# Patient Record
Sex: MALE | Race: WHITE | NOT HISPANIC OR LATINO | Employment: FULL TIME | URBAN - METROPOLITAN AREA
[De-identification: names, ages, dates, MRNs, and addresses within clinical notes are randomized per-mention and may not be internally consistent; named-entity substitution may affect disease eponyms.]

---

## 2025-05-20 NOTE — PROGRESS NOTES
Subjective   Patient ID: Amadeo Anne is a 35 y.o. male    HPI  35 y.o. male who presents to hospitals for fertility consultation. He reports he and his partner (aged 27 years) have been trying to conceive. He notes taking an at home semen analysis and received low levels. His partner has been tracking her menstrual cycles using an ni. She has had a miscarriages in the past. Denies any urinary symptoms such as urgency, frequency, hematuria, pain, burning, and infections. He denies any ejaculation and erectile dysfunctions. Varicocele noted during physical exam. Otherwise normal exam today.       Review of Systems    All systems were reviewed. Anything negative was noted in the HPI.    Objective   Physical Exam  Genitourinary:     Pubic Area: No rash.       Penis: Normal and circumcised. No hypospadias.       Testes:         Right: Mass, tenderness, swelling, testicular hydrocele or varicocele not present. Right testis is descended.         Left: Mass, tenderness, swelling, testicular hydrocele or varicocele not present. Left testis is descended.      Epididymis:      Right: Not inflamed or enlarged. No mass or tenderness.      Left: Not inflamed or enlarged. No mass or tenderness.         General: Well developed, well nourished, alert and cooperative, appears in no acute distress   Eyes: Non-injected conjunctiva, sclera clear, no proptosis   Cardiac: Extremities are warm and well perfused. No edema, cyanosis or pallor   Lungs: Breathing is easy, non-labored. Speaking in clear and complete sentences. Normal diaphragmatic movement   MSK: Ambulatory with steady gait, unassisted   Neuro: Alert and oriented to person, place, and time   Psych: Demonstrates good judgment and reason, without hallucinations, abnormal affect or abnormal behaviors   Skin: No obvious lesions, no rashes       No CVA tenderness bilaterally   No suprapubic pain or discomfort       Medical History[1]      Surgical History[2]      Assessment/Plan    Fertility Consultation    35 y.o. male who presents for the above condition, We had a very long and extensive discussion with the patient regarding the pathophysiology, differential diagnosis, risk factor, management, natural history, incidence and diagnostic work-up of the condition.  Explained to him that semen analysis are highly variable test, and that his results and the role of total motile sperm which is the best indicator for successful fertility potential using a natural conception. I explained to him all the lifestyle modification that could improve the semen parameters including refraining from smoking alcohol, healthy eating sleeping and exercise, losing some weight, refrain from anything that would increase the temperature of the scrotum such as hot tubs, Jacuzzi, saunas, or tight underwear. We discussed the optimal fertile window during which they should have intercourse and I instructed him to abstain for 4 days before semen analysis. He verbalized understanding and would like to proceed.     - Educated about optimal timing for conception  - Advised semen analysis as a diagnostic tool to evaluate fertility     Plan:  - Semen Analysis  - Follow-up in 1 month with results      E&M visit today is associated with current or anticipated ongoing medical care services related to a patient's single, serious condition or a complex condition.     5/21/2025    Scribe Attestation  By signing my name below, I, Shannon Smith attest that this documentation has been prepared under the direction and in the presence of Dr. Gabriele Flores.          [1] No past medical history on file.  [2] No past surgical history on file.

## 2025-05-21 ENCOUNTER — APPOINTMENT (OUTPATIENT)
Dept: UROLOGY | Facility: HOSPITAL | Age: 35
End: 2025-05-21

## 2025-05-21 DIAGNOSIS — Z31.41 FERTILITY TESTING: Primary | ICD-10-CM

## 2025-05-21 PROCEDURE — 99204 OFFICE O/P NEW MOD 45 MIN: CPT | Performed by: STUDENT IN AN ORGANIZED HEALTH CARE EDUCATION/TRAINING PROGRAM

## 2025-05-21 PROCEDURE — 99214 OFFICE O/P EST MOD 30 MIN: CPT | Performed by: STUDENT IN AN ORGANIZED HEALTH CARE EDUCATION/TRAINING PROGRAM

## 2025-06-25 ENCOUNTER — APPOINTMENT (OUTPATIENT)
Dept: UROLOGY | Facility: HOSPITAL | Age: 35
End: 2025-06-25

## 2025-07-15 ENCOUNTER — APPOINTMENT (OUTPATIENT)
Dept: ENDOCRINOLOGY | Facility: CLINIC | Age: 35
End: 2025-07-15

## 2025-07-15 DIAGNOSIS — Z31.41 FERTILITY TESTING: ICD-10-CM

## 2025-07-15 LAB
% EX RESIDUAL CYTOPLASM (SEMEN): 0 %
% HEAD DEFECTS (SEMEN): 99.5 %
% NECK MIDPIECE (SEMEN): 47 %
% NORMAL (SEMEN): 0.5 % (ref 4–?)
% TAIL DEFECTS (SEMEN): 23.5 %
ABSTINENCE (DAYS): 2 DAYS (ref 2–7)
AGGLUTINATION (SEMEN): NO
ANALYZED TIME:: ABNORMAL
ANDROLOGY LAB ID#: ABNORMAL
CLUMPS (SEMEN): NO
COLLECTED COMPLETELY: YES
COLLECTION LOCATION:: ABNORMAL
COLLECTION METHOD:: ABNORMAL
CONCENTRATION(SEMEN): 5.42 MILL/ML (ref 15–?)
DEBRIS (SEMEN): YES
NON PROG. MOTILITY (SEMEN): 8 %
PROG. MOTILITY (SEMEN): 41 % (ref 32–?)
RECEIVED TIME:: ABNORMAL
SEMEN APPEARANCE: NORMAL
SEMEN LIQUEFACTION: NORMAL
SEMEN VISCOSITY: ABNORMAL
TOT. NO OF NORM. MOTILE SPERM (SEMEN): 0.04 MILL
TOT. NO OF NORM. SPERM (SEMEN): 0.08 MILL
TOTAL MOTILITY (SEMEN): 49 % (ref 40–?)
TOTAL NO OF MOTILE (SEMEN): 7.44 MILL
TOTAL NO OF SPERM (SEMEN): 15.18 MILL (ref 39–?)
VOLUME (SEMEN): 2.8 ML (ref 1.5–?)

## 2025-07-15 PROCEDURE — 89322 SEMEN ANAL STRICT CRITERIA: CPT | Performed by: OBSTETRICS & GYNECOLOGY

## 2025-07-31 NOTE — PROGRESS NOTES
Subjective   Patient ID: Amadeo Anne is a 35 y.o. male    HPI  35 y.o. male who presents for a 1 month follow-up visit regarding his fertility. He reports he and his partner (aged 27 years) have been trying to conceive. He notes taking an at home semen analysis and received low levels. His partner has been tracking her menstrual cycles using an ni. She has had a miscarriages in the past. Denies any urinary symptoms such as urgency, frequency, hematuria, pain, burning, and infections. He denies any ejaculation and erectile dysfunctions. Varicocele noted during physical exam. Otherwise normal exam today.     Today, he reports only abstaining for 2 days prior to semen analysis. Grade 3 varicocele noted during physical exam today. Otherwise normal exam.     The most recent Semen Analysis, conducted on 07/15/2025, revealed:  Concentration  Total No of Sperm   Normal                                    5.42 mill/mL          15.18 mill               0.5 %      Review of Systems    All systems were reviewed. Anything negative was noted in the HPI.    Objective   Physical Exam  Genitourinary:     Pubic Area: No rash.       Penis: Normal and circumcised. No hypospadias.       Testes:         Right: Mass, tenderness, swelling, testicular hydrocele or varicocele not present. Right testis is descended.         Left: Mass, tenderness, swelling, testicular hydrocele or varicocele not present. Left testis is descended.      Epididymis:      Right: Not inflamed or enlarged. No mass or tenderness.      Left: Not inflamed or enlarged. No mass or tenderness.         General: Well developed, well nourished, alert and cooperative, appears in no acute distress   Eyes: Non-injected conjunctiva, sclera clear, no proptosis   Cardiac: Extremities are warm and well perfused. No edema, cyanosis or pallor   Lungs: Breathing is easy, non-labored. Speaking in clear and complete sentences. Normal diaphragmatic movement   MSK: Ambulatory with  steady gait, unassisted   Neuro: Alert and oriented to person, place, and time   Psych: Demonstrates good judgment and reason, without hallucinations, abnormal affect or abnormal behaviors   Skin: No obvious lesions, no rashes       No CVA tenderness bilaterally   No suprapubic pain or discomfort       Medical History[1]      Surgical History[2]      Assessment/Plan   Fertility Consultation, oligoteratospermia , G3 left varicocele     35 y.o. male who presents for the above condition, We had a very long and extensive discussion with the patient regarding the pathophysiology, differential diagnosis, risk factor, management, natural history, incidence and diagnostic work-up of the condition.  Explained to him that semen analysis are highly variable test, and that his results and the role of total motile sperm which is the best indicator for successful fertility potential using a natural conception. I explained to him all the lifestyle modification that could improve the semen parameters including refraining from smoking alcohol, healthy eating sleeping and exercise, losing some weight, refrain from anything that would increase the temperature of the scrotum such as hot tubs, Jacuzzi, saunas, or tight underwear. We discussed the optimal fertile window during which they should have intercourse and I instructed him to abstain for 4 days before semen analysis. He verbalized understanding and would like to proceed.     - Educated about optimal timing for conception  - Advised semen analysis as a diagnostic tool to evaluate fertility  - Provided pt with the option to repeat semen analysis or do follow-up blood work.     Plan:  - T, FSH, LH, prolactin, Etsrogen  Doppler US   Fu after results         E&M visit today is associated with current or anticipated ongoing medical care services related to a patient's single, serious condition or a complex condition.     8/5/2025    Scribe Attestation  By signing my name below, I,  Shannon Smith attest that this documentation has been prepared under the direction and in the presence of Dr. Gabriele Flores.          [1] No past medical history on file.  [2] No past surgical history on file.

## 2025-08-05 ENCOUNTER — APPOINTMENT (OUTPATIENT)
Dept: UROLOGY | Facility: HOSPITAL | Age: 35
End: 2025-08-05

## 2025-08-05 DIAGNOSIS — N46.11 OLIGOSPERMIA: Primary | ICD-10-CM

## 2025-08-05 PROCEDURE — 99214 OFFICE O/P EST MOD 30 MIN: CPT | Performed by: STUDENT IN AN ORGANIZED HEALTH CARE EDUCATION/TRAINING PROGRAM

## 2025-08-05 PROCEDURE — 99212 OFFICE O/P EST SF 10 MIN: CPT
